# Patient Record
Sex: FEMALE | URBAN - METROPOLITAN AREA
[De-identification: names, ages, dates, MRNs, and addresses within clinical notes are randomized per-mention and may not be internally consistent; named-entity substitution may affect disease eponyms.]

---

## 2020-09-03 ENCOUNTER — APPOINTMENT (RX ONLY)
Dept: URBAN - METROPOLITAN AREA CLINIC 135 | Facility: CLINIC | Age: 55
Setting detail: DERMATOLOGY
End: 2020-09-03

## 2020-09-03 DIAGNOSIS — D485 NEOPLASM OF UNCERTAIN BEHAVIOR OF SKIN: ICD-10-CM

## 2020-09-03 DIAGNOSIS — Z12.83 ENCOUNTER FOR SCREENING FOR MALIGNANT NEOPLASM OF SKIN: ICD-10-CM

## 2020-09-03 PROBLEM — C44.321 SQUAMOUS CELL CARCINOMA OF SKIN OF NOSE: Status: ACTIVE | Noted: 2020-09-03

## 2020-09-03 PROBLEM — D48.5 NEOPLASM OF UNCERTAIN BEHAVIOR OF SKIN: Status: ACTIVE | Noted: 2020-09-03

## 2020-09-03 PROCEDURE — ? RECOMMENDATIONS

## 2020-09-03 PROCEDURE — ? COUNSELING

## 2020-09-03 PROCEDURE — ? BIOPSY BY SHAVE METHOD

## 2020-09-03 PROCEDURE — 11102 TANGNTL BX SKIN SINGLE LES: CPT

## 2020-09-03 PROCEDURE — 99203 OFFICE O/P NEW LOW 30 MIN: CPT | Mod: 25

## 2020-09-03 ASSESSMENT — LOCATION DETAILED DESCRIPTION DERM: LOCATION DETAILED: RIGHT DISTAL PRETIBIAL REGION

## 2020-09-03 ASSESSMENT — LOCATION ZONE DERM: LOCATION ZONE: LEG

## 2020-09-03 ASSESSMENT — LOCATION SIMPLE DESCRIPTION DERM: LOCATION SIMPLE: RIGHT PRETIBIAL REGION

## 2020-09-03 NOTE — PROCEDURE: BIOPSY BY SHAVE METHOD
Cryotherapy Text: The wound bed was treated with cryotherapy after the biopsy was performed.
Destruction After The Procedure: No
Notification Instructions: Patient will be notified of biopsy results. However, patient instructed to call the office if not contacted within 2 weeks.
Accession #: [id number]
Biopsy Type: H and E
Electrodesiccation Text: The wound bed was treated with electrodesiccation after the biopsy was performed.
Wound Care: Petrolatum
Anesthesia Type: 1% lidocaine with epinephrine
Additional Anesthesia Volume In Cc (Will Not Render If 0): 0
Anesthesia Volume In Cc: 0.5
Type Of Destruction Used: Electrodesiccation
Electrodesiccation And Curettage Text: The wound bed was treated with electrodesiccation and curettage after the biopsy was performed.
Dressing: bandage
Silver Nitrate Text: The wound bed was treated with silver nitrate after the biopsy was performed.
Depth Of Biopsy: dermis
Was A Bandage Applied: Yes
Information: Selecting Yes will display possible errors in your note based on the variables you have selected. This validation is only offered as a suggestion for you. PLEASE NOTE THAT THE VALIDATION TEXT WILL BE REMOVED WHEN YOU FINALIZE YOUR NOTE. IF YOU WANT TO FAX A PRELIMINARY NOTE YOU WILL NEED TO TOGGLE THIS TO 'NO' IF YOU DO NOT WANT IT IN YOUR FAXED NOTE.
Billing Type: Third-Party Bill
Biopsy Method: Personna blade
Consent: Written consent was obtained and risks were reviewed including but not limited to scarring, infection, bleeding, scabbing, incomplete removal, nerve damage and allergy to anesthesia.
Detail Level: Detailed
Post-Care Instructions: I reviewed with the patient in detail post-care instructions. Patient is to keep the biopsy site dry overnight, and then apply bacitracin twice daily until healed. Patient may apply hydrogen peroxide soaks to remove any crusting.

## 2020-09-03 NOTE — HPI: OTHER
Condition:: Skin lesion
Please Describe Your Condition:: Pt. States she has a lesion on the tip of her nose that has been treated via biopsy twice and it has come back benign but now it is visually displeasing. Pt. States she would also like to have a full skin check performed today as she does have a lengthy history of skin cancer (see interval history). Pt. States she has no further questions or concerns at this time.

## 2020-09-03 NOTE — PROCEDURE: RECOMMENDATIONS
Recommendation Preamble: The following recommendations were made during the visit:
Detail Level: Zone
Recommendations (Free Text): Refer Pt. To Dr. [place] Maldonado for MOHs. This lesion was previously biopsied in [place] prior to coming to see us.

## 2020-09-15 ENCOUNTER — APPOINTMENT (RX ONLY)
Dept: URBAN - METROPOLITAN AREA CLINIC 135 | Facility: CLINIC | Age: 55
Setting detail: DERMATOLOGY
End: 2020-09-15

## 2020-09-15 PROBLEM — C44.712 BASAL CELL CARCINOMA OF SKIN OF RIGHT LOWER LIMB, INCLUDING HIP: Status: ACTIVE | Noted: 2020-09-15

## 2020-09-15 PROCEDURE — ? COUNSELING

## 2020-09-15 PROCEDURE — 99212 OFFICE O/P EST SF 10 MIN: CPT

## 2020-09-15 PROCEDURE — ? PRESCRIPTION

## 2020-09-15 RX ORDER — IMIQUIMOD 50 MG/G
CREAM TOPICAL
Qty: 20 | Refills: 1 | Status: ERX | COMMUNITY
Start: 2020-09-15

## 2020-09-15 RX ADMIN — IMIQUIMOD: 50 CREAM TOPICAL at 00:00

## 2023-07-31 ENCOUNTER — APPOINTMENT (RX ONLY)
Dept: URBAN - METROPOLITAN AREA CLINIC 288 | Facility: CLINIC | Age: 58
Setting detail: DERMATOLOGY
End: 2023-07-31

## 2023-07-31 DIAGNOSIS — L81.4 OTHER MELANIN HYPERPIGMENTATION: ICD-10-CM | Status: STABLE

## 2023-07-31 DIAGNOSIS — L82.1 OTHER SEBORRHEIC KERATOSIS: ICD-10-CM | Status: STABLE

## 2023-07-31 DIAGNOSIS — L57.0 ACTINIC KERATOSIS: ICD-10-CM | Status: INADEQUATELY CONTROLLED

## 2023-07-31 DIAGNOSIS — D18.0 HEMANGIOMA: ICD-10-CM | Status: STABLE

## 2023-07-31 DIAGNOSIS — D485 NEOPLASM OF UNCERTAIN BEHAVIOR OF SKIN: ICD-10-CM | Status: INADEQUATELY CONTROLLED

## 2023-07-31 DIAGNOSIS — L57.8 OTHER SKIN CHANGES DUE TO CHRONIC EXPOSURE TO NONIONIZING RADIATION: ICD-10-CM | Status: STABLE

## 2023-07-31 DIAGNOSIS — D22 MELANOCYTIC NEVI: ICD-10-CM | Status: STABLE

## 2023-07-31 PROBLEM — D48.5 NEOPLASM OF UNCERTAIN BEHAVIOR OF SKIN: Status: ACTIVE | Noted: 2023-07-31

## 2023-07-31 PROBLEM — D22.62 MELANOCYTIC NEVI OF LEFT UPPER LIMB, INCLUDING SHOULDER: Status: ACTIVE | Noted: 2023-07-31

## 2023-07-31 PROBLEM — D18.01 HEMANGIOMA OF SKIN AND SUBCUTANEOUS TISSUE: Status: ACTIVE | Noted: 2023-07-31

## 2023-07-31 PROCEDURE — 99203 OFFICE O/P NEW LOW 30 MIN: CPT | Mod: 25

## 2023-07-31 PROCEDURE — 17000 DESTRUCT PREMALG LESION: CPT | Mod: 59

## 2023-07-31 PROCEDURE — ? FULL BODY SKIN EXAM

## 2023-07-31 PROCEDURE — ? LIQUID NITROGEN

## 2023-07-31 PROCEDURE — ? BIOPSY BY SHAVE METHOD

## 2023-07-31 PROCEDURE — 17003 DESTRUCT PREMALG LES 2-14: CPT

## 2023-07-31 PROCEDURE — ? COUNSELING

## 2023-07-31 PROCEDURE — ? TREATMENT REGIMEN

## 2023-07-31 PROCEDURE — 11102 TANGNTL BX SKIN SINGLE LES: CPT

## 2023-07-31 ASSESSMENT — LOCATION SIMPLE DESCRIPTION DERM
LOCATION SIMPLE: RIGHT FOREHEAD
LOCATION SIMPLE: RIGHT FOREARM
LOCATION SIMPLE: RIGHT UPPER BACK
LOCATION SIMPLE: LEFT FOREARM

## 2023-07-31 ASSESSMENT — LOCATION ZONE DERM
LOCATION ZONE: FACE
LOCATION ZONE: TRUNK
LOCATION ZONE: ARM

## 2023-07-31 ASSESSMENT — LOCATION DETAILED DESCRIPTION DERM
LOCATION DETAILED: LEFT DISTAL DORSAL FOREARM
LOCATION DETAILED: RIGHT SUPERIOR FOREHEAD
LOCATION DETAILED: LEFT PROXIMAL DORSAL FOREARM
LOCATION DETAILED: RIGHT MEDIAL UPPER BACK
LOCATION DETAILED: RIGHT DISTAL DORSAL FOREARM
LOCATION DETAILED: RIGHT FOREHEAD
LOCATION DETAILED: RIGHT INFERIOR LATERAL FOREHEAD

## 2023-07-31 NOTE — PROCEDURE: LIQUID NITROGEN
Consent: The patient's consent was obtained including but not limited to risks of crusting, scabbing, blistering, scarring, darker or lighter pigmentary change, recurrence, incomplete removal and infection.
Show Aperture Variable?: Yes
Detail Level: Detailed
Render Note In Bullet Format When Appropriate: No
Duration Of Freeze Thaw-Cycle (Seconds): 1
Post-Care Instructions: I reviewed with the patient in detail post-care instructions. Patient is to wear sunprotection, and avoid picking at any of the treated lesions. Pt may apply Vaseline to crusted or scabbing areas.

## 2023-07-31 NOTE — PROCEDURE: BIOPSY BY SHAVE METHOD

## 2023-08-14 ENCOUNTER — APPOINTMENT (RX ONLY)
Dept: URBAN - METROPOLITAN AREA CLINIC 288 | Facility: CLINIC | Age: 58
Setting detail: DERMATOLOGY
End: 2023-08-14

## 2023-08-14 PROBLEM — C44.612 BASAL CELL CARCINOMA OF SKIN OF RIGHT UPPER LIMB, INCLUDING SHOULDER: Status: ACTIVE | Noted: 2023-08-14

## 2023-08-14 PROCEDURE — ? CURETTAGE AND DESTRUCTION

## 2023-08-14 PROCEDURE — 17262 DSTRJ MAL LES T/A/L 1.1-2.0: CPT

## 2023-12-04 ENCOUNTER — APPOINTMENT (RX ONLY)
Dept: URBAN - METROPOLITAN AREA CLINIC 288 | Facility: CLINIC | Age: 58
Setting detail: DERMATOLOGY
End: 2023-12-04

## 2023-12-04 DIAGNOSIS — L81.4 OTHER MELANIN HYPERPIGMENTATION: ICD-10-CM | Status: STABLE

## 2023-12-04 DIAGNOSIS — D18.0 HEMANGIOMA: ICD-10-CM | Status: STABLE

## 2023-12-04 DIAGNOSIS — Z85.828 PERSONAL HISTORY OF OTHER MALIGNANT NEOPLASM OF SKIN: ICD-10-CM

## 2023-12-04 DIAGNOSIS — L65.9 NONSCARRING HAIR LOSS, UNSPECIFIED: ICD-10-CM | Status: INADEQUATELY CONTROLLED

## 2023-12-04 DIAGNOSIS — L57.8 OTHER SKIN CHANGES DUE TO CHRONIC EXPOSURE TO NONIONIZING RADIATION: ICD-10-CM | Status: STABLE

## 2023-12-04 DIAGNOSIS — D22 MELANOCYTIC NEVI: ICD-10-CM | Status: STABLE

## 2023-12-04 PROBLEM — D18.01 HEMANGIOMA OF SKIN AND SUBCUTANEOUS TISSUE: Status: ACTIVE | Noted: 2023-12-04

## 2023-12-04 PROBLEM — D22.62 MELANOCYTIC NEVI OF LEFT UPPER LIMB, INCLUDING SHOULDER: Status: ACTIVE | Noted: 2023-12-04

## 2023-12-04 PROCEDURE — ? DEFER

## 2023-12-04 PROCEDURE — ? ADDITIONAL NOTES

## 2023-12-04 PROCEDURE — ? FULL BODY SKIN EXAM

## 2023-12-04 PROCEDURE — 99214 OFFICE O/P EST MOD 30 MIN: CPT

## 2023-12-04 PROCEDURE — ? ORDER TESTS

## 2023-12-04 PROCEDURE — ? TREATMENT REGIMEN

## 2023-12-04 PROCEDURE — ? COUNSELING

## 2023-12-04 ASSESSMENT — LOCATION SIMPLE DESCRIPTION DERM
LOCATION SIMPLE: RIGHT FOREARM
LOCATION SIMPLE: LEFT FOREARM

## 2023-12-04 ASSESSMENT — LOCATION DETAILED DESCRIPTION DERM
LOCATION DETAILED: LEFT PROXIMAL DORSAL FOREARM
LOCATION DETAILED: RIGHT DISTAL RADIAL DORSAL FOREARM
LOCATION DETAILED: RIGHT DISTAL DORSAL FOREARM
LOCATION DETAILED: LEFT DISTAL DORSAL FOREARM

## 2023-12-04 ASSESSMENT — LOCATION ZONE DERM: LOCATION ZONE: ARM

## 2023-12-04 NOTE — PROCEDURE: MIPS QUALITY
Quality 128: Preventive Care And Screening: Body Mass Index (Bmi) Screening And Follow-Up Plan: BMI not documented, reason not otherwise specified.
Quality 110: Preventive Care And Screening: Influenza Immunization: Influenza Immunization Administered during Influenza season
Quality 130: Documentation Of Current Medications In The Medical Record: Current Medications Documented
Detail Level: Detailed
Quality 431: Preventive Care And Screening: Unhealthy Alcohol Use - Screening: Patient not identified as an unhealthy alcohol user when screened for unhealthy alcohol use using a systematic screening method
Quality 226: Preventive Care And Screening: Tobacco Use: Screening And Cessation Intervention: Patient screened for tobacco use and is an ex/non-smoker

## 2023-12-04 NOTE — PROCEDURE: ORDER TESTS
Performing Laboratory: [id number]
Expected Date Of Service: 12/04/2023
Bill For Surgical Tray: no
Clinical Notes (To The Lab): Testosterone labs to be drawn first in the morning\\nPlease complete blood draw Day 2-6 after start of menstrual cycle
Billing Type: Third-Party Bill

## 2023-12-04 NOTE — PROCEDURE: DEFER
Size Of Lesion In Cm (Optional): 0
Procedure To Be Performed At Next Visit: Biopsy by punch method
Detail Level: Detailed
Instructions (Optional): Alopecia  - 4mm punch biopsy x 2
Introduction Text (Please End With A Colon): The following procedure was deferred:

## 2023-12-04 NOTE — PROCEDURE: ADDITIONAL NOTES
Additional Notes: Pt has had 10 MOHS procedures for BCC
Detail Level: Simple
Render Risk Assessment In Note?: no
Additional Notes: Menopause 6 years ago, gradual hair loss, has had stress hair loss, history of hypothyroidism.

## 2024-02-05 ENCOUNTER — APPOINTMENT (RX ONLY)
Dept: URBAN - METROPOLITAN AREA CLINIC 288 | Facility: CLINIC | Age: 59
Setting detail: DERMATOLOGY
End: 2024-02-05

## 2024-02-05 DIAGNOSIS — L85.3 XEROSIS CUTIS: ICD-10-CM | Status: INADEQUATELY CONTROLLED

## 2024-02-05 DIAGNOSIS — L64.8 OTHER ANDROGENIC ALOPECIA: ICD-10-CM | Status: INADEQUATELY CONTROLLED

## 2024-02-05 PROCEDURE — 99214 OFFICE O/P EST MOD 30 MIN: CPT

## 2024-02-05 PROCEDURE — ? PRESCRIPTION MEDICATION MANAGEMENT

## 2024-02-05 PROCEDURE — ? FULL BODY SKIN EXAM - DECLINED

## 2024-02-05 PROCEDURE — ? PRESCRIPTION

## 2024-02-05 PROCEDURE — ? COUNSELING

## 2024-02-05 RX ORDER — LACTIC ACID/UREA 10 %-40 %
GEL (GRAM) TOPICAL
Qty: 120 | Refills: 0 | COMMUNITY
Start: 2024-02-05

## 2024-02-05 RX ORDER — SPIRONOLACTONE 50 MG/1
TABLET, FILM COATED ORAL
Qty: 30 | Refills: 5 | Status: ERX | COMMUNITY
Start: 2024-02-05

## 2024-02-05 RX ORDER — MINOXIDIL 2.5 MG/1
TABLET ORAL QD
Qty: 90 | Refills: 1 | Status: ERX | COMMUNITY
Start: 2024-02-05

## 2024-02-05 RX ADMIN — SPIRONOLACTONE: 50 TABLET, FILM COATED ORAL at 00:00

## 2024-02-05 RX ADMIN — MINOXIDIL: 2.5 TABLET ORAL at 00:00

## 2024-02-05 RX ADMIN — Medication: at 00:00

## 2024-02-05 ASSESSMENT — LOCATION ZONE DERM
LOCATION ZONE: SCALP
LOCATION ZONE: FEET

## 2024-02-05 ASSESSMENT — LOCATION SIMPLE DESCRIPTION DERM
LOCATION SIMPLE: LEFT PLANTAR SURFACE
LOCATION SIMPLE: RIGHT PLANTAR SURFACE
LOCATION SIMPLE: SCALP

## 2024-02-05 ASSESSMENT — LOCATION DETAILED DESCRIPTION DERM
LOCATION DETAILED: LEFT MEDIAL PLANTAR HEEL
LOCATION DETAILED: RIGHT MEDIAL PLANTAR HEEL
LOCATION DETAILED: RIGHT SUPERIOR PARIETAL SCALP

## 2024-02-05 NOTE — PROCEDURE: MIPS QUALITY
Quality 130: Documentation Of Current Medications In The Medical Record: Current Medications Documented
Quality 431: Preventive Care And Screening: Unhealthy Alcohol Use - Screening: Patient not identified as an unhealthy alcohol user when screened for unhealthy alcohol use using a systematic screening method
Quality 226: Preventive Care And Screening: Tobacco Use: Screening And Cessation Intervention: Tobacco Screening not Performed
Quality 110: Preventive Care And Screening: Influenza Immunization: Influenza immunization was not ordered or administered, reason not given
Detail Level: Detailed

## 2024-02-05 NOTE — PROCEDURE: PRESCRIPTION MEDICATION MANAGEMENT
Initiate Treatment: minoxidil 2.5 mg tablet QD: Take half a tablet daily.\\n\\nspironolactone 50 mg tablet : Take one pill daily
Render In Strict Bullet Format?: No
Detail Level: Zone
Initiate Treatment: Pronal 10 %-40 % topical gel Apply to heels at night

## 2024-05-03 ENCOUNTER — RX ONLY (OUTPATIENT)
Age: 59
Setting detail: RX ONLY
End: 2024-05-03

## 2024-05-03 RX ORDER — HYDROCORTISONE 25 MG/G
OINTMENT TOPICAL
Qty: 28.35 | Refills: 1 | Status: ERX | COMMUNITY
Start: 2024-05-03

## 2024-07-23 ENCOUNTER — APPOINTMENT (RX ONLY)
Dept: URBAN - METROPOLITAN AREA CLINIC 288 | Facility: CLINIC | Age: 59
Setting detail: DERMATOLOGY
End: 2024-07-23

## 2024-07-23 DIAGNOSIS — D18.0 HEMANGIOMA: ICD-10-CM | Status: STABLE

## 2024-07-23 DIAGNOSIS — L81.4 OTHER MELANIN HYPERPIGMENTATION: ICD-10-CM | Status: STABLE

## 2024-07-23 DIAGNOSIS — D22 MELANOCYTIC NEVI: ICD-10-CM | Status: STABLE

## 2024-07-23 DIAGNOSIS — L64.8 OTHER ANDROGENIC ALOPECIA: ICD-10-CM | Status: INADEQUATELY CONTROLLED

## 2024-07-23 DIAGNOSIS — L82.1 OTHER SEBORRHEIC KERATOSIS: ICD-10-CM | Status: STABLE

## 2024-07-23 DIAGNOSIS — L57.0 ACTINIC KERATOSIS: ICD-10-CM

## 2024-07-23 PROBLEM — D18.01 HEMANGIOMA OF SKIN AND SUBCUTANEOUS TISSUE: Status: ACTIVE | Noted: 2024-07-23

## 2024-07-23 PROBLEM — D22.5 MELANOCYTIC NEVI OF TRUNK: Status: ACTIVE | Noted: 2024-07-23

## 2024-07-23 PROCEDURE — ? LIQUID NITROGEN

## 2024-07-23 PROCEDURE — 99214 OFFICE O/P EST MOD 30 MIN: CPT | Mod: 25

## 2024-07-23 PROCEDURE — ? PRESCRIPTION MEDICATION MANAGEMENT

## 2024-07-23 PROCEDURE — ? COUNSELING

## 2024-07-23 PROCEDURE — ? TREATMENT REGIMEN

## 2024-07-23 PROCEDURE — 17003 DESTRUCT PREMALG LES 2-14: CPT

## 2024-07-23 PROCEDURE — ? PRESCRIPTION

## 2024-07-23 PROCEDURE — ? FULL BODY SKIN EXAM

## 2024-07-23 PROCEDURE — 17000 DESTRUCT PREMALG LESION: CPT

## 2024-07-23 RX ORDER — MINOXIDIL 2.5 MG/1
TABLET ORAL QD
Qty: 90 | Refills: 1 | Status: ERX

## 2024-07-23 RX ORDER — SPIRONOLACTONE 50 MG/1
TABLET, FILM COATED ORAL
Qty: 30 | Refills: 5 | Status: ERX

## 2024-07-23 ASSESSMENT — LOCATION SIMPLE DESCRIPTION DERM
LOCATION SIMPLE: RIGHT FOREHEAD
LOCATION SIMPLE: RIGHT UPPER BACK
LOCATION SIMPLE: ABDOMEN
LOCATION SIMPLE: LEFT UPPER BACK
LOCATION SIMPLE: RIGHT SHOULDER
LOCATION SIMPLE: NOSE
LOCATION SIMPLE: SCALP

## 2024-07-23 ASSESSMENT — LOCATION DETAILED DESCRIPTION DERM
LOCATION DETAILED: RIGHT SUPERIOR PARIETAL SCALP
LOCATION DETAILED: RIGHT ANTERIOR SHOULDER
LOCATION DETAILED: PERIUMBILICAL SKIN
LOCATION DETAILED: LEFT MID-UPPER BACK
LOCATION DETAILED: RIGHT SUPERIOR FOREHEAD
LOCATION DETAILED: RIGHT MID-UPPER BACK
LOCATION DETAILED: NASAL DORSUM

## 2024-07-23 ASSESSMENT — LOCATION ZONE DERM
LOCATION ZONE: FACE
LOCATION ZONE: SCALP
LOCATION ZONE: ARM
LOCATION ZONE: NOSE
LOCATION ZONE: TRUNK

## 2024-07-23 NOTE — HPI: EVALUATION OF SKIN LESION(S)
Hpi Title: Evaluation of Skin Lesions
Additional History: Patient presents today for full body exam , concerning spots to the forehead and nose scaly and sore to the touch .

## 2024-07-23 NOTE — PROCEDURE: PRESCRIPTION MEDICATION MANAGEMENT
Initiate Treatment: minoxidil 2.5 mg tablet QD: Take half a tablet daily.\\n\\nspironolactone 50 mg tablet : Take one pill daily
Render In Strict Bullet Format?: No
Detail Level: Zone

## 2025-01-13 ENCOUNTER — APPOINTMENT (OUTPATIENT)
Dept: URBAN - METROPOLITAN AREA CLINIC 288 | Facility: CLINIC | Age: 60
Setting detail: DERMATOLOGY
End: 2025-01-13

## 2025-01-13 DIAGNOSIS — Z85.828 PERSONAL HISTORY OF OTHER MALIGNANT NEOPLASM OF SKIN: ICD-10-CM | Status: STABLE

## 2025-01-13 DIAGNOSIS — L85.3 XEROSIS CUTIS: ICD-10-CM | Status: INADEQUATELY CONTROLLED

## 2025-01-13 DIAGNOSIS — L82.1 OTHER SEBORRHEIC KERATOSIS: ICD-10-CM | Status: STABLE

## 2025-01-13 DIAGNOSIS — L21.8 OTHER SEBORRHEIC DERMATITIS: ICD-10-CM | Status: INADEQUATELY CONTROLLED

## 2025-01-13 DIAGNOSIS — D18.0 HEMANGIOMA: ICD-10-CM | Status: STABLE

## 2025-01-13 DIAGNOSIS — L81.4 OTHER MELANIN HYPERPIGMENTATION: ICD-10-CM | Status: STABLE

## 2025-01-13 DIAGNOSIS — D22 MELANOCYTIC NEVI: ICD-10-CM | Status: STABLE

## 2025-01-13 DIAGNOSIS — B07.8 OTHER VIRAL WARTS: ICD-10-CM | Status: INADEQUATELY CONTROLLED

## 2025-01-13 DIAGNOSIS — L57.0 ACTINIC KERATOSIS: ICD-10-CM | Status: INADEQUATELY CONTROLLED

## 2025-01-13 PROBLEM — D18.01 HEMANGIOMA OF SKIN AND SUBCUTANEOUS TISSUE: Status: ACTIVE | Noted: 2025-01-13

## 2025-01-13 PROBLEM — D22.5 MELANOCYTIC NEVI OF TRUNK: Status: ACTIVE | Noted: 2025-01-13

## 2025-01-13 PROCEDURE — ? COUNSELING

## 2025-01-13 PROCEDURE — 17110 DESTRUCTION B9 LES UP TO 14: CPT

## 2025-01-13 PROCEDURE — 17000 DESTRUCT PREMALG LESION: CPT | Mod: 59

## 2025-01-13 PROCEDURE — ? LIQUID NITROGEN

## 2025-01-13 PROCEDURE — ? MDM - TREATMENT GOALS

## 2025-01-13 PROCEDURE — ? TREATMENT REGIMEN

## 2025-01-13 PROCEDURE — ? PRESCRIPTION MEDICATION MANAGEMENT

## 2025-01-13 PROCEDURE — ? PRESCRIPTION

## 2025-01-13 PROCEDURE — 99214 OFFICE O/P EST MOD 30 MIN: CPT | Mod: 25

## 2025-01-13 PROCEDURE — ? FULL BODY SKIN EXAM

## 2025-01-13 PROCEDURE — 17003 DESTRUCT PREMALG LES 2-14: CPT | Mod: 59

## 2025-01-13 RX ORDER — FLUOCINONIDE 0.5 MG/ML
SOLUTION TOPICAL
Qty: 60 | Refills: 6 | Status: ERX | COMMUNITY
Start: 2025-01-13

## 2025-01-13 RX ORDER — KETOCONAZOLE 20 MG/ML
SHAMPOO, SUSPENSION TOPICAL BIW
Qty: 120 | Refills: 11 | Status: ERX | COMMUNITY
Start: 2025-01-13

## 2025-01-13 RX ORDER — UREA 40 G/100G
CREAM TOPICAL
Qty: 85 | Refills: 11 | Status: ERX | COMMUNITY
Start: 2025-01-13

## 2025-01-13 RX ADMIN — FLUOCINONIDE: 0.5 SOLUTION TOPICAL at 00:00

## 2025-01-13 RX ADMIN — KETOCONAZOLE: 20 SHAMPOO, SUSPENSION TOPICAL at 00:00

## 2025-01-13 RX ADMIN — UREA: 40 CREAM TOPICAL at 00:00

## 2025-01-13 ASSESSMENT — LOCATION DETAILED DESCRIPTION DERM
LOCATION DETAILED: RIGHT VENTRAL LATERAL PROXIMAL FOREARM
LOCATION DETAILED: MID-FRONTAL SCALP
LOCATION DETAILED: RIGHT SUPERIOR UPPER BACK
LOCATION DETAILED: LEFT PROXIMAL DORSAL FOREARM
LOCATION DETAILED: RIGHT DISTAL DORSAL FOREARM
LOCATION DETAILED: LEFT RADIAL DORSAL HAND
LOCATION DETAILED: MIDDLE STERNUM
LOCATION DETAILED: UPPER STERNUM
LOCATION DETAILED: RIGHT DORSAL FOOT
LOCATION DETAILED: RIGHT MEDIAL SUPERIOR CHEST
LOCATION DETAILED: RIGHT PROXIMAL DORSAL FOREARM
LOCATION DETAILED: RIGHT CENTRAL FRONTAL SCALP
LOCATION DETAILED: LEFT DORSAL FOOT
LOCATION DETAILED: PERIUMBILICAL SKIN
LOCATION DETAILED: RIGHT MID-UPPER BACK
LOCATION DETAILED: LEFT SUPERIOR UPPER BACK

## 2025-01-13 ASSESSMENT — LOCATION SIMPLE DESCRIPTION DERM
LOCATION SIMPLE: LEFT FOREARM
LOCATION SIMPLE: ABDOMEN
LOCATION SIMPLE: LEFT HAND
LOCATION SIMPLE: RIGHT SCALP
LOCATION SIMPLE: CHEST
LOCATION SIMPLE: ANTERIOR SCALP
LOCATION SIMPLE: RIGHT FOOT
LOCATION SIMPLE: RIGHT UPPER BACK
LOCATION SIMPLE: RIGHT FOREARM
LOCATION SIMPLE: LEFT FOOT
LOCATION SIMPLE: LEFT UPPER BACK

## 2025-01-13 ASSESSMENT — LOCATION ZONE DERM
LOCATION ZONE: FEET
LOCATION ZONE: ARM
LOCATION ZONE: HAND
LOCATION ZONE: SCALP
LOCATION ZONE: TRUNK

## 2025-01-13 NOTE — PROCEDURE: PRESCRIPTION MEDICATION MANAGEMENT
Render In Strict Bullet Format?: No
Detail Level: Zone
Initiate Treatment: urea 40 % topical cream
Initiate Treatment: - ketoconazole 2 % shampoo 2-3x weekly\\n- fluocinonide 0.05 % topical solution daily for up to 2 weeks during flares, then take a 1 week break. Can resume using daily up to half the days of the month thereafter

## 2025-01-13 NOTE — PROCEDURE: LIQUID NITROGEN
Detail Level: Detailed
Show Applicator Variable?: Yes
Application Tool (Optional): Liquid Nitrogen Sprayer
Post-Care Instructions: I reviewed with the patient in detail post-care instructions. Patient is to wear sunprotection, and avoid picking at any of the treated lesions. Pt may apply Vaseline to crusted or scabbing areas.
Render Note In Bullet Format When Appropriate: No
Number Of Freeze-Thaw Cycles: 2 freeze-thaw cycles
Consent: The patient's consent was obtained including but not limited to risks of crusting, scabbing, blistering, scarring, darker or lighter pigmentary change, recurrence, incomplete removal and infection.
Medical Necessity Clause: This procedure was medically necessary because the lesions that were treated were:
Spray Paint Text: The liquid nitrogen was applied to the skin utilizing a spray paint frosting technique.
Medical Necessity Information: It is in your best interest to select a reason for this procedure from the list below. All of these items fulfill various CMS LCD requirements except the new and changing color options.

## 2025-01-13 NOTE — HPI: EVALUATION OF SKIN LESION(S)
Hpi Title: Evaluation of Skin Lesions
Additional History: Pt would like to discuss Botox and dysport.

## 2025-01-27 RX ORDER — SPIRONOLACTONE 50 MG/1
TABLET, FILM COATED ORAL
Qty: 90 | Refills: 3 | Status: ERX